# Patient Record
(demographics unavailable — no encounter records)

---

## 2024-10-11 NOTE — DISCUSSION/SUMMARY
[de-identified] : The patient is doing well regarding their right TKR. Her thigh pain is coming from her lower back. I suggested she sees a spine specialist and pain management. KOOS score was obtained. In regard to her left arthritic knee, we will continue non-operatively. She will continue her home exercises.   Patient can continue home exercises, Tylenol, weight management and activities as tolerated. All questions were answered, understanding verbalized. Patient is in agreement with plan of treatment. Patient may follow up in 1 year.

## 2024-10-11 NOTE — PHYSICAL EXAM
[de-identified] :  General appearance: well nourished and hydrated, pleasant, alert and oriented x 3, cooperative. HEENT: Normocephalic, EOM intact, Nasal septum midline, Oral cavity clear, External auditory canal clear. Cardiovascular: no apparent abnormalities, no lower leg edema, no varicosities, pedal pulses are palpable. Lymphatics Lymph nodes: none palpated, Lymphedema: not present. Neurologic: sensation is normal, no muscle weakness in upper or lower extremities, patella tendon reflexes intact . Dermatologic no apparent skin lesions, moist, warm, no rash. Spine:cervical spine appears normal and moves freely, thoracic spine appears normal and moves freely, lumbosacral spine appears normal and moves freely. Gait: nonantalgic.   Left knee Inspection: trace effusion. Wounds: none. Alignment: normal. Palpation: peripatellar tenderness on palpation. ROM active (in degrees):0-130 with crepitus  Ligamentous laxity: all ligaments appear stable,, negative ant. drawer test, negative post. drawer test, stable to varus stress test, stable to valgus stress test. negative Lachman's test, negative pivot shift test Meniscal Test: negative McMurrays, negative Carmelo. Patellofemoral Alignment Test: Q angle-, normal. Muscle Test: good quad strength. Leg examination: calf is soft and non-tender.   Right knee Inspection: no effusion or erythema. Wounds: none. Alignment: normal. Palpation: no specific tenderness on palpation. ROM active (in degrees): 0-130  Ligamentous laxity: all ligaments appear stable,, negative ant. drawer test, negative post. drawer test, stable to varus stress test, stable to valgus stress test. negative Lachman's test, negative pivot shift test Meniscal Test: negative McMurrays, negative Carmelo. Patellofemoral Alignment Test: Q angle-, normal. Muscle Test: good quad strength. Leg examination: calf is soft and non-tender. [de-identified] : Right knee xray, AP, lateral, merchant view taken at the office today demonstrates a total knee replacement in satisfactory position and alignment. No evidence of loosening. The patella sits in a central position.  Left knee x-rays, standing AP/Lateral and Merchant films, and 45-degree PA standing view, taken at the office today shows diffuse tricompartmental degenerative arthritis, medial joint space narrowing, marginal osteophytes, bone on bone sclerosis, patellofemoral joint space narrowing, peripheral osteophytes, Kellgren and Solis grade 2-3 with significant patellofemoral arthritis.

## 2024-10-11 NOTE — CONSULT LETTER
[Dear  ___] : Dear  [unfilled], [Consult Letter:] : I had the pleasure of evaluating your patient, [unfilled]. [Please see my note below.] : Please see my note below. [Consult Closing:] : Thank you very much for allowing me to participate in the care of this patient.  If you have any questions, please do not hesitate to contact me. [Sincerely,] : Sincerely, [FreeTextEntry2] : NAS VYAS

## 2024-10-11 NOTE — HISTORY OF PRESENT ILLNESS
[de-identified] :  RAÚL PEÑALOZA 72 year old female presents for follow up evaluation of s/p right TKR in 2018. Of note, she is complaining of right thigh pain that radiated to her knee. She has previously seen a pain management doctor. She denies any knee pain at this time. She uses a cane for ambulation. She also has left OA and complains of mild pain. She has no formal home exercise program.

## 2024-10-11 NOTE — ADDENDUM
[FreeTextEntry1] : This note was written by Anmol Resendiz on 10/11/2024 acting as scribe for Dr. Kannan Rodriguez M.D.  I, Dr. Kannan Rodriguez, have read and attest that all the information, medical decision making and discharge instructions within are true and accurate.